# Patient Record
Sex: MALE | Race: WHITE | NOT HISPANIC OR LATINO | Employment: OTHER
[De-identification: names, ages, dates, MRNs, and addresses within clinical notes are randomized per-mention and may not be internally consistent; named-entity substitution may affect disease eponyms.]

---

## 2021-03-03 DIAGNOSIS — Z23 NEED FOR VACCINATION: ICD-10-CM

## 2022-01-19 ENCOUNTER — OFFICE VISIT (OUTPATIENT)
Dept: MEDICAL GROUP | Facility: CLINIC | Age: 67
End: 2022-01-19
Payer: MEDICARE

## 2022-01-19 VITALS
HEIGHT: 70 IN | DIASTOLIC BLOOD PRESSURE: 85 MMHG | WEIGHT: 181 LBS | BODY MASS INDEX: 25.91 KG/M2 | OXYGEN SATURATION: 95 % | RESPIRATION RATE: 16 BRPM | SYSTOLIC BLOOD PRESSURE: 122 MMHG | HEART RATE: 89 BPM

## 2022-01-19 DIAGNOSIS — E11.9 TYPE 2 DIABETES MELLITUS WITHOUT COMPLICATION, WITHOUT LONG-TERM CURRENT USE OF INSULIN (HCC): ICD-10-CM

## 2022-01-19 PROBLEM — Z95.5 HISTORY OF CORONARY ARTERY STENT PLACEMENT: Status: ACTIVE | Noted: 2022-01-19

## 2022-01-19 PROBLEM — M48.061 SPINAL STENOSIS OF LUMBAR REGION: Status: ACTIVE | Noted: 2021-04-21

## 2022-01-19 PROBLEM — I71.40 ABDOMINAL AORTIC ANEURYSM (HCC): Status: ACTIVE | Noted: 2021-04-21

## 2022-01-19 PROBLEM — I10 HYPERTENSION: Status: ACTIVE | Noted: 2022-01-19

## 2022-01-19 PROCEDURE — 99213 OFFICE O/P EST LOW 20 MIN: CPT | Mod: GC

## 2022-01-19 RX ORDER — TAMSULOSIN HYDROCHLORIDE 0.4 MG/1
CAPSULE ORAL
COMMUNITY
Start: 2021-11-05 | End: 2022-03-21 | Stop reason: SDUPTHER

## 2022-01-19 RX ORDER — OXYCODONE AND ACETAMINOPHEN 10; 325 MG/1; MG/1
1 TABLET ORAL EVERY 6 HOURS PRN
COMMUNITY
Start: 2021-12-29

## 2022-01-19 RX ORDER — FENOFIBRATE 160 MG/1
160 TABLET ORAL DAILY
COMMUNITY
Start: 2022-01-15

## 2022-01-19 RX ORDER — ATORVASTATIN CALCIUM 10 MG/1
10 TABLET, FILM COATED ORAL NIGHTLY
COMMUNITY

## 2022-01-19 RX ORDER — METOPROLOL SUCCINATE 25 MG/1
25 TABLET, EXTENDED RELEASE ORAL DAILY
COMMUNITY
Start: 2022-01-15

## 2022-01-19 RX ORDER — EZETIMIBE 10 MG/1
10 TABLET ORAL DAILY
COMMUNITY
Start: 2022-01-15

## 2022-01-19 RX ORDER — KETOCONAZOLE 20 MG/G
CREAM TOPICAL DAILY
COMMUNITY
End: 2022-01-19

## 2022-01-19 RX ORDER — CLOPIDOGREL BISULFATE 75 MG/1
75 TABLET ORAL DAILY
COMMUNITY
Start: 2022-01-15

## 2022-01-19 RX ORDER — LISINOPRIL 20 MG/1
20 TABLET ORAL DAILY
COMMUNITY
Start: 2021-11-10

## 2022-01-19 NOTE — ASSESSMENT & PLAN NOTE
Pt reports A1c was 6.2 while taking trulicity. They want to try to get a prescription again for trulicity if the insurance will cover.  - Rx previous dose of 0.75g qWeekly, 3 month supply   - Instructed pt to call the office if insurance does not cover the medication and we will substitute with a different med, aiming for same class of GLP1 meds.  - Gave return precautions/ER precautions  - RTC between 2-3 months for A1c re-check

## 2022-01-19 NOTE — PROGRESS NOTES
Subjective:     CC: high A1c    HPI:   Jani presents today with concern over elevated A1c lab.  On 12/29/21, his A1c was 11.3. In July of 2021, pt reports that his A1c was 6.2. He hasn't taken his trulicity shortly after July 2021 because his insurance stopped covering trulicity. He has not been checking blood glucose at all.    He eats pasta, ham sandwiches, meat/potatoes/vegetables. He is active with projects around the house but does not exercise.    He takes metformin 1000mg BID    Sciatica was bothering him last April with pain and numbness in his right leg. It was numb on the medial aspect of his leg up to his thigh as well as his foot. Also pain in lower right back. He got a cortisone shot from [facility] in July and the pain has improved.     Problem   Type 2 Diabetes Mellitus Without Complications (Hcc)    DM2, not insulin controlled, with A1c 11.3 in 12/2021     History of Coronary Artery Stent Placement   Hypertension   Spinal Stenosis of Lumbar Region   Abdominal Aortic Aneurysm (Hcc)   Biventricular Implantable Cardioverter-Defibrillator (Icd) in Situ       Current Outpatient Medications Ordered in Epic   Medication Sig Dispense Refill   • lisinopril (PRINIVIL) 20 MG Tab      • ezetimibe (ZETIA) 10 MG Tab      • fenofibrate (TRIGLIDE) 160 MG tablet      • clopidogrel (PLAVIX) 75 MG Tab      • metoprolol SR (TOPROL XL) 25 MG TABLET SR 24 HR      • oxyCODONE-acetaminophen (PERCOCET-10)  MG Tab      • tamsulosin (FLOMAX) 0.4 MG capsule      • metformin (GLUCOPHAGE) 1000 MG tablet Take 1,000 mg by mouth 2 times a day with meals.     • ketoconazole (NIZORAL) 2 % Cream Apply  topically every day.     • aspirin EC (ECOTRIN) 81 MG Tablet Delayed Response Take 81 mg by mouth every day.     • atorvastatin (LIPITOR) 10 MG Tab Take 10 mg by mouth every evening.       No current Epic-ordered facility-administered medications on file.       ROS:  Negative except for above in HPI    Objective:     Exam:  BP  "122/85 (BP Location: Left arm, Patient Position: Sitting, BP Cuff Size: Large adult)   Pulse 89   Resp 16   Ht 1.778 m (5' 10\")   Wt 82.1 kg (181 lb)   SpO2 95%   BMI 25.97 kg/m²  Body mass index is 25.97 kg/m².    Gen: Alert and oriented, No apparent distress.  HEENT: NCAT, MMM, No lymphadenopathy  Lungs: Normal effort, CTA bilaterally, no wheezes, rhonchi, or rales  CV: Regular rate and rhythm. No murmurs, rubs, or gallops. Radial pulses palpable bilat  Abd: Soft, non-distended, no guarding, no rebound, non-tender to palpation  Ext: No clubbing, cyanosis, edema.  Neuro: Non-focal    Labs: A1c 11.3. Please see scanned documents for other lab values.    Assessment & Plan:     66 y.o. male with the following -     Problem List Items Addressed This Visit     Type 2 diabetes mellitus without complications (HCC)     Pt reports A1c was 6.2 while taking trulicity. They want to try to get a prescription again for trulicity if the insurance will cover.  - Rx previous dose of 0.75g qWeekly, 3 month supply   - Instructed pt to call the office if insurance does not cover the medication and we will substitute with a different med, aiming for same class of GLP1 meds.  - Gave return precautions/ER precautions  - RTC between 2-3 months for A1c re-check           Relevant Medications    metformin (GLUCOPHAGE) 1000 MG tablet    Dulaglutide 0.75 MG/0.5ML Solution Pen-injector              No follow-ups on file.    Please note that this dictation was created using voice recognition software. I have made every reasonable attempt to correct obvious errors, but I expect that there are errors of grammar and possibly content that I did not discover before finalizing the note.        "

## 2022-02-16 NOTE — TELEPHONE ENCOUNTER
Prescription request was approved, pt on duraglutide 0.75 weekly. No change in medication regimen, we are trying to determine which equivalent prescription insurance will cover.

## 2022-03-21 RX ORDER — TAMSULOSIN HYDROCHLORIDE 0.4 MG/1
0.4 CAPSULE ORAL DAILY
Qty: 90 CAPSULE | Refills: 0 | Status: SHIPPED | OUTPATIENT
Start: 2022-03-21 | End: 2022-06-19

## 2022-03-22 NOTE — TELEPHONE ENCOUNTER
Refilled flomax. Patient has been taking this medication for several years and is seen regularly in the clinic.

## 2022-04-04 NOTE — TELEPHONE ENCOUNTER
LM for patient to check in to make sure he was able to get his refills. If there was an issue to give our office a call.

## 2022-04-08 ENCOUNTER — APPOINTMENT (OUTPATIENT)
Dept: MEDICAL GROUP | Facility: CLINIC | Age: 67
End: 2022-04-08
Payer: MEDICARE

## 2022-04-14 NOTE — TELEPHONE ENCOUNTER
Received request via: Pharmacy    Was the patient seen in the last year in this department? Yes- 01/19/2022    Does the patient have an active prescription (recently filled or refills available) for medication(s) requested? No

## 2022-05-05 ENCOUNTER — OFFICE VISIT (OUTPATIENT)
Dept: MEDICAL GROUP | Facility: CLINIC | Age: 67
End: 2022-05-05
Payer: MEDICARE

## 2022-05-05 VITALS
DIASTOLIC BLOOD PRESSURE: 82 MMHG | OXYGEN SATURATION: 94 % | WEIGHT: 180 LBS | BODY MASS INDEX: 26.66 KG/M2 | HEIGHT: 69 IN | HEART RATE: 88 BPM | RESPIRATION RATE: 12 BRPM | SYSTOLIC BLOOD PRESSURE: 147 MMHG

## 2022-05-05 DIAGNOSIS — L03.039 PARONYCHIA OF GREAT TOE: ICD-10-CM

## 2022-05-05 DIAGNOSIS — E11.9 TYPE 2 DIABETES MELLITUS WITHOUT COMPLICATION, WITHOUT LONG-TERM CURRENT USE OF INSULIN (HCC): ICD-10-CM

## 2022-05-05 PROCEDURE — 99213 OFFICE O/P EST LOW 20 MIN: CPT | Mod: GE | Performed by: STUDENT IN AN ORGANIZED HEALTH CARE EDUCATION/TRAINING PROGRAM

## 2022-05-05 RX ORDER — CEPHALEXIN 500 MG/1
500 CAPSULE ORAL 4 TIMES DAILY
Qty: 20 CAPSULE | Refills: 0 | Status: SHIPPED | OUTPATIENT
Start: 2022-05-05 | End: 2022-05-10

## 2022-05-05 NOTE — ASSESSMENT & PLAN NOTE
Lateral aspect of right great toe with beginning stages of infection evidenced by swelling and erythema.  No notable discharge.  No pain, though compromised by patient's loss of sensation from sciatica.  Will prescribe 5 days of Keflex with instructions to do hot soaks with just water for 15 minutes at a time 3 times a day.  Advised against cutting the nail too closely and or getting pedicures.  Will refer to podiatry for prevention of future recurrence.  Patient is at risk with his underlying type 2 diabetes and lack of sensation.

## 2022-05-05 NOTE — PROGRESS NOTES
Subjective:     CC: Toe redness    HPI:   Jani presents today with     Problem   Paronychia of Great Toe    Patient here complaining of significant redness that he noticed this morning of his right great toe.  After getting out of the shower, patient states a chunk of skin came off and he has noticed swelling and redness.  Patient does not endorse any pain because of his sciatica the sensation of the top of the foot is compromised.  Patient made an appointment urgently today out of concern given his coexisting diabetes.     Type 2 Diabetes Mellitus Without Complications (Hcc)    DM2, not insulin controlled, with A1c 11.3 in 12/2021 reportedly.  Patient is currently managing with metformin and Trulicity.  Tolerating medications without side effects.         Current Outpatient Medications Ordered in Epic   Medication Sig Dispense Refill   • cephALEXin (KEFLEX) 500 MG Cap Take 1 Capsule by mouth 4 times a day for 5 days. 20 Capsule 0   • metformin (GLUCOPHAGE) 1000 MG tablet Take 1 Tablet by mouth 2 times a day with meals. 60 Tablet 0   • Dulaglutide 0.75 MG/0.5ML Solution Pen-injector Inject 1 Each under the skin every 7 days for 90 days. 12 Each 0   • Dulaglutide 0.75 MG/0.5ML Solution Pen-injector Inject  under the skin.     • tamsulosin (FLOMAX) 0.4 MG capsule Take 1 Capsule by mouth every day for 90 days. 90 Capsule 0   • lisinopril (PRINIVIL) 20 MG Tab      • ezetimibe (ZETIA) 10 MG Tab      • fenofibrate (TRIGLIDE) 160 MG tablet      • clopidogrel (PLAVIX) 75 MG Tab      • metoprolol SR (TOPROL XL) 25 MG TABLET SR 24 HR      • oxyCODONE-acetaminophen (PERCOCET-10)  MG Tab      • aspirin EC (ECOTRIN) 81 MG Tablet Delayed Response Take 81 mg by mouth every day.     • atorvastatin (LIPITOR) 10 MG Tab Take 10 mg by mouth every evening.       No current Epic-ordered facility-administered medications on file.       Objective:     Exam:  /82 (BP Location: Right arm, Patient Position: Sitting, BP Cuff Size:  "Adult)   Pulse 88   Resp 12   Ht 1.753 m (5' 9\") Comment: pt reported  Wt 81.6 kg (180 lb) Comment: pt reported  SpO2 94%   BMI 26.58 kg/m²  Body mass index is 26.58 kg/m².    General: Normal appearing. No distress.  Pulmonary: Clear to ausculation.  Normal effort. No rales, ronchi, or wheezing.  Cardiovascular: Regular rate and rhythm without murmur.   Skin: Warm and dry.  No obvious lesions.  Erythema and swelling to lateral aspect of right great toe, without discharge.      Assessment & Plan:     67 y.o. male with the following -     Problem List Items Addressed This Visit     Type 2 diabetes mellitus without complications (HCC)     Point-of-care A1c today of 7.1.  Much improved.  Continue current regimen.  Repeat A1c in 3 months.  Continue moderate intensity statin and ACE inhibitor.         Relevant Orders    Referral to Podiatry    Paronychia of great toe     Lateral aspect of right great toe with beginning stages of infection evidenced by swelling and erythema.  No notable discharge.  No pain, though compromised by patient's loss of sensation from sciatica.  Will prescribe 5 days of Keflex with instructions to do hot soaks with just water for 15 minutes at a time 3 times a day.  Advised against cutting the nail too closely and or getting pedicures.  Will refer to podiatry for prevention of future recurrence.  Patient is at risk with his underlying type 2 diabetes and lack of sensation.         Relevant Medications    cephALEXin (KEFLEX) 500 MG Cap    Other Relevant Orders    Referral to Podiatry            No follow-ups on file.    Tyesha Tony MD   PGY2        "

## 2022-05-05 NOTE — ASSESSMENT & PLAN NOTE
Point-of-care A1c today of 7.1.  Much improved.  Continue current regimen.  Repeat A1c in 3 months.  Continue moderate intensity statin and ACE inhibitor.

## 2022-05-24 NOTE — TELEPHONE ENCOUNTER
Pt doing well on current DM regimen per recent note. Will refill metformin. He should follow up in 10-12 weeks for A1c check.

## 2022-10-27 ENCOUNTER — OFFICE VISIT (OUTPATIENT)
Dept: MEDICAL GROUP | Facility: CLINIC | Age: 67
End: 2022-10-27
Payer: MEDICARE

## 2022-10-27 VITALS
OXYGEN SATURATION: 95 % | RESPIRATION RATE: 16 BRPM | SYSTOLIC BLOOD PRESSURE: 121 MMHG | HEIGHT: 69 IN | HEART RATE: 98 BPM | WEIGHT: 201 LBS | DIASTOLIC BLOOD PRESSURE: 82 MMHG | BODY MASS INDEX: 29.77 KG/M2

## 2022-10-27 DIAGNOSIS — Z00.00 WELL ADULT EXAM: ICD-10-CM

## 2022-10-27 DIAGNOSIS — E11.9 TYPE 2 DIABETES MELLITUS WITHOUT COMPLICATIONS (HCC): ICD-10-CM

## 2022-10-27 DIAGNOSIS — Z23 NEED FOR VACCINATION: ICD-10-CM

## 2022-10-27 PROCEDURE — 99214 OFFICE O/P EST MOD 30 MIN: CPT | Mod: GC

## 2022-10-27 RX ORDER — KETOCONAZOLE 20 MG/G
15 CREAM TOPICAL DAILY
COMMUNITY

## 2022-10-27 RX ORDER — TAMSULOSIN HYDROCHLORIDE 0.4 MG/1
0.4 CAPSULE ORAL
COMMUNITY

## 2022-10-27 ASSESSMENT — PATIENT HEALTH QUESTIONNAIRE - PHQ9: CLINICAL INTERPRETATION OF PHQ2 SCORE: 0

## 2022-10-27 NOTE — ASSESSMENT & PLAN NOTE
- Performed monofilament exam  - [facility] attempting to obtain labs now  - Pt agrees to see optomotrist    Monofilament testing with a 10 gram force: sensation intact: intact bilaterally  Visual Inspection: Feet without maceration, ulcers, fissures.  Pedal pulses: intact bilaterally

## 2022-10-27 NOTE — PROGRESS NOTES
"Subjective:     CC: DM2    HPI:   Jani presents today with DM2    Problem   Type 2 Diabetes Mellitus Without Complications (Hcc)    10/27/22 Dr Restrepo: A1c 7.6 2 weeks ago. On metformin and trulicity. Tolerating well    5/5/22 Dr Tony: DM2, not insulin controlled, with A1c 11.3 in 12/2021 reportedly.  Patient is currently managing with metformin and Trulicity.  Tolerating medications without side effects.         Current Outpatient Medications Ordered in Epic   Medication Sig Dispense Refill    ketoconazole (NIZORAL) 2 % Cream Apply 15 g topically every day.      tamsulosin (FLOMAX) 0.4 MG capsule Take 0.4 mg by mouth 1/2 hour after breakfast.      Dulaglutide 1.5 MG/0.5ML Solution Pen-injector Inject 1 Each under the skin every 7 days. 12 Each 6    metformin (GLUCOPHAGE) 1000 MG tablet Take 1 Tablet by mouth 2 times a day with meals. 60 Tablet 3    lisinopril (PRINIVIL) 20 MG Tab Take 20 mg by mouth every day.      ezetimibe (ZETIA) 10 MG Tab Take 10 mg by mouth every day.      fenofibrate (TRIGLIDE) 160 MG tablet Take 160 mg by mouth every day.      clopidogrel (PLAVIX) 75 MG Tab Take 75 mg by mouth every day.      metoprolol SR (TOPROL XL) 25 MG TABLET SR 24 HR Take 25 mg by mouth every day.      oxyCODONE-acetaminophen (PERCOCET-10)  MG Tab Take 1 Tablet by mouth every 6 hours as needed.      aspirin EC (ECOTRIN) 81 MG Tablet Delayed Response Take 81 mg by mouth every day.      atorvastatin (LIPITOR) 10 MG Tab Take 10 mg by mouth every evening.       No current Epic-ordered facility-administered medications on file.       ROS:  Negative except for above in HPI    Objective:     Exam:  /82 (BP Location: Left arm, Patient Position: Sitting, BP Cuff Size: Large adult)   Pulse 98   Resp 16   Ht 1.753 m (5' 9\")   Wt 91.2 kg (201 lb)   SpO2 95%   BMI 29.68 kg/m²  Body mass index is 29.68 kg/m².    Gen: Alert and oriented, No apparent distress.  HEENT: NCAT, MMM, No " lymphadenopathy  Lungs: Normal effort, CTA bilaterally, no wheezes, rhonchi, or rales  CV: Regular rate and rhythm. No murmurs, rubs, or gallops. Radial pulses palpable bilat  Abd: Soft, non-distended, no guarding, no rebound, non-tender to palpation  Ext: No clubbing, cyanosis, edema.  Neuro: Non-focal    Labs: A1c 7.6 reported by patient    Assessment & Plan:     67 y.o. male with the following -     Problem List Items Addressed This Visit       Type 2 diabetes mellitus without complications (HCC)     - Performed monofilament exam  - Pt reports labs were drawn at [facility], attempting to obtain labs now  - Pt agrees to see optomotrist    Monofilament testing with a 10 gram force: sensation intact: intact bilaterally  Visual Inspection: Feet without maceration, ulcers, fissures.  Pedal pulses: intact bilaterally           Relevant Medications    Dulaglutide 1.5 MG/0.5ML Solution Pen-injector    Other Relevant Orders    Diabetic Monofilament LE Exam (Completed)     Other Visit Diagnoses       Need for vaccination        Well adult exam                Return in about 6 weeks (around 12/8/2022). For repeat A1c check

## 2022-12-06 ENCOUNTER — TELEPHONE (OUTPATIENT)
Dept: MEDICAL GROUP | Facility: CLINIC | Age: 67
End: 2022-12-06

## 2022-12-06 NOTE — TELEPHONE ENCOUNTER
Last night I called Jani to check in on a trulicity prescription. There was no answer and I was unable to leave a message.   He LM that he had a missed call and was wondering if it has any thing to do with a lab slip with the correct orders Dr. Restrepo was going to do.

## 2022-12-30 NOTE — TELEPHONE ENCOUNTER
VOICEMAIL  1. Caller Name: Jani Frank                        Call Back Number: [phone]     2. Message: Lab order needed from Dr. Restrepo. He has an upcoming appointment on 01/09/2023- however he does not have a lab order. He uses [facility] on [address].     3. Patient approves office to leave a detailed voicemail/MyChart message: yes

## 2023-01-09 ENCOUNTER — APPOINTMENT (OUTPATIENT)
Dept: MEDICAL GROUP | Facility: CLINIC | Age: 68
End: 2023-01-09
Payer: MEDICARE

## 2023-05-03 DIAGNOSIS — Z95.5 HISTORY OF CORONARY ARTERY STENT PLACEMENT: ICD-10-CM

## 2023-05-03 DIAGNOSIS — E11.9 TYPE 2 DIABETES MELLITUS WITHOUT COMPLICATION, WITHOUT LONG-TERM CURRENT USE OF INSULIN (HCC): ICD-10-CM

## 2023-05-03 DIAGNOSIS — I10 HYPERTENSION, UNSPECIFIED TYPE: ICD-10-CM

## 2023-05-03 RX ORDER — DULAGLUTIDE 1.5 MG/.5ML
INJECTION, SOLUTION SUBCUTANEOUS
Qty: 2 ML | Refills: 6 | Status: SHIPPED | OUTPATIENT
Start: 2023-05-03